# Patient Record
(demographics unavailable — no encounter records)

---

## 2025-01-22 NOTE — SOCIAL HISTORY
[FreeTextEntry6] : Household: Parents  and have shared custody.  -Has a brother 5 years and sister 3 years.

## 2025-01-22 NOTE — REASON FOR VISIT
[FreeTextEntry2] : Follow up for ADHD monitoring and medication management. [FreeTextEntry4] : Azstarys 39.2mg/7.8mg daily  MPH IR Solution 8-10mg on weekends as needed.     [FreeTextEntry1] : Mother  [FreeTextEntry3] : August 2024 [TextEntry] : This visit was provided via telehealth using real-time 2-way audio visual technology. The patient, ANNA VILLAGOMEZ was located at home, [address] [place] [address], at the time of the visit.  The provider, MORGAN MCCARTHY, was located in [facility] at the time of the visit. The patient, ANNA VILLAGOMEZ and Provider participated in the telehealth encounter. Parent also participated. Verbal consent for telehealth services was given on Jan 22 2025  9:00AM by the patient/parent.

## 2025-01-22 NOTE — PLAN
[FreeTextEntry3] : Continue IEP/Current Services Continue Azstarys 39.2mg/7.8mg daily Continue MPH IR 5mg/5mL Solution - Take 8-10mL as needed in the afternoons/weekends. Answered parent/patient questions. Follow-up 3-4 months for continued monitoring Follow-up via telephone as needed.

## 2025-01-22 NOTE — HISTORY OF PRESENT ILLNESS
[FreeTextEntry5] : Grade/School: 3rd Grade Classroom Setting: General Education Classroom IEP: Speech/Language Impairment Services: OT 1x/week, Speech/Language Therapy 1x/week Private tutoring/therapy: None   [FreeTextEntry1] : School/Academics: -Mom reports that he is having a good school year -He continues to do well academically  -There have been no concerns from his teacher -He is working on his communication and conversational skills - mom says that sometimes he just needs to slow down and pause so the other person can respond.  -Doing well with homework - getting it done without issue.  Home: No concerns  Social: Overall, does well and gets along with his peers  Activities: baseball, football  Medication/Side Effects: -Mom reports that the current regimen continues to work very well for his symptoms. She says that it doesn't seem to be lasting as long as it used to but nothing unmanageable.  -She reports that he has more evening activities after school now, so they have been using the SA booster more.  Duration: Takes it about 7:45am and wears off 4:30-5pm  Appetite/Diet: No decreased appetite while on medication - eats as he normally would. Sleep: Sleeps well overall - some nights take longer for him to settle. Mom gives melatonin and finds it helpful. HA: None SA: None Tics: None [FreeTextEntry6] : Medication Hx:\par  Metadate CD 10mg (1/2023-3/2023) - more focused but angry/irritable, tantrums at home\par  Focalin XR 5mg (June 2023) - Alvarado reported he liked this medication but began making comments about killing himself when boundaries being set\par

## 2025-03-11 NOTE — PHYSICAL EXAM
[Normal] : soft, non-tender, non-distended, no masses palpated, no HSM and normal bowel sounds [de-identified] : Testicles both retracted. I believe I am feeling both but difficult to assess.

## 2025-04-23 NOTE — CONSULT LETTER
[FreeTextEntry1] : Dear Dr. JOHN PENALOZA,      I had the pleasure of consulting on ANNA VILLAGOMEZ today.  Below is my note regarding the office visit today.      Thank you so very much for allowing me to participate in ANNA's care.  Please don't hesitate to call me should any questions or issues arise.        Sincerely,     Yosvany Mahajan MD, FACS, \[facility]\""U    Chief, Pediatric Urology    Professor of Urology and Pediatrics    [employer] [facility] [facility] [employer] [facility] [employer]        President, American Urological Association - [place] Section    Past-President, Societies for Pediatric Urology

## 2025-04-23 NOTE — DATA REVIEWED
[FreeTextEntry1] : EXAM: [id number] - US DPLX SCROTUM  - ORDERED BY: JOHN PENALOZA  PROCEDURE DATE:  03/20/2025   INTERPRETATION:  CLINICAL INFORMATION: Undescended testicles.  COMPARISON: None available.  TECHNIQUE: Testicular ultrasound utilizing color and spectral Doppler.  FINDINGS:  RIGHT: Right testis: 1.7 cm x 0.7 cm x 1.2 cm (volume 0.8 mL). Located in the right inguinal canal. Normal echogenicity and echotexture with no masses or areas of architectural distortion. Normal arterial and venous blood flow pattern. Right epididymis: Within normal limits. Right hydrocele: None. Right varicocele: None.  LEFT: Left testis: 1.7 cm x 0.8 cm x 1.0 cm (volume 0.6 mL). Located in the left inguinal canal. Normal echogenicity and echotexture with no masses or areas of architectural distortion. Normal arterial and venous blood flow pattern. Left epididymis: Within normal limits. Left hydrocele: None. Left varicocele: None.  IMPRESSION: Bilateral undescended testicles located within their respective inguinal canal. Otherwise, normal sonographic appearance of the testicles.  --- End of Report ---

## 2025-04-23 NOTE — HISTORY OF PRESENT ILLNESS
[TextBox_4] : ANNA is here for evaluation today. He is a healthy 8 year old who was noted recently to have an undescended testicle. This was NOT noted at birth. The testis has NOT been descending with time. No history of trauma or infection or inflammation. No pain or swelling on either side. Scrotal ultrasound ordered by PCP (3/20/25) demonstrated bilateral undescended testes in their respective inguinal canals. Presents today for examination.

## 2025-04-23 NOTE — DATA REVIEWED
[FreeTextEntry1] : EXAM: [id number] - US DPLX SCROTUM  - ORDERED BY: JOHN PENALOZA  PROCEDURE DATE:  03/20/2025   INTERPRETATION:  CLINICAL INFORMATION: Undescended testicles.  COMPARISON: None available.  TECHNIQUE: Testicular ultrasound utilizing color and spectral Doppler.  FINDINGS:  RIGHT: Right testis: 1.7 cm x 0.7 cm x 1.2 cm (volume 0.8 mL). Located in the right inguinal canal. Normal echogenicity and echotexture with no masses or areas of architectural distortion. Normal arterial and venous blood flow pattern. Right epididymis: Within normal limits. Right hydrocele: None. Right varicocele: None.  LEFT: Left testis: 1.7 cm x 0.8 cm x 1.0 cm (volume 0.6 mL). Located in the left inguinal canal. Normal echogenicity and echotexture with no masses or areas of architectural distortion. Normal arterial and venous blood flow pattern. Left epididymis: Within normal limits. Left hydrocele: None. Left varicocele: None.  IMPRESSION: Bilateral undescended testicles located within their respective inguinal canal. Otherwise, normal sonographic appearance of the testicles.  --- End of Report ---

## 2025-04-23 NOTE — CONSULT LETTER
[FreeTextEntry1] : Dear Dr. JOHN PENALOZA,      I had the pleasure of consulting on ANNA VILLAGOMEZ today.  Below is my note regarding the office visit today.      Thank you so very much for allowing me to participate in ANNA's care.  Please don't hesitate to call me should any questions or issues arise.        Sincerely,     Yosvany Mahajan MD, FACS, [facility]    Chief, Pediatric Urology    Professor of Urology and Pediatrics    [employer] [facility] [employer]        President, American Urological Association - [place] Section    Past-President, Societies for Pediatric Urology

## 2025-04-23 NOTE — DATA REVIEWED
[FreeTextEntry1] : EXAM: [id number] - US DPLX SCROTUM  - ORDERED BY: JOHN PENALOZA  PROCEDURE DATE:  03/20/2025   INTERPRETATION:  CLINICAL INFORMATION: Undescended testicles.  COMPARISON: None available.  TECHNIQUE: Testicular ultrasound utilizing color and spectral Doppler.  FINDINGS:  RIGHT: Right testis: 1.7 cm x 0.7 cm x 1.2 cm (volume 0.8 mL). Located in the right inguinal canal. Normal echogenicity and echotexture with no masses or areas of architectural distortion. Normal arterial and venous blood flow pattern. Right epididymis: Within normal limits. Right hydrocele: None. Right varicocele: None.  LEFT: Left testis: 1.7 cm x 0.8 cm x 1.0 cm (volume 0.6 mL). Located in the left inguinal canal. Normal echogenicity and echotexture with no masses or areas of architectural distortion. Normal arterial and venous blood flow pattern. Left epididymis: Within normal limits. Left hydrocele: None. Left varicocele: None.  IMPRESSION: Bilateral undescended testicles located within their respective inguinal canal. Otherwise, normal sonographic appearance of the testicles.  --- End of Report ---

## 2025-04-23 NOTE — CONSULT LETTER
[FreeTextEntry1] : Dear Dr. JOHN PENALOZA,      I had the pleasure of consulting on ANNA VILLAGOMEZ today.  Below is my note regarding the office visit today.      Thank you so very much for allowing me to participate in ANNA's care.  Please don't hesitate to call me should any questions or issues arise.        Sincerely,     Yosvany Mahajan MD, FACS, [facility]    Chief, Pediatric Urology    Professor of Urology and Pediatrics    [facility] [employer]        President, American Urological Association - [place] Section    Past-President, Societies for Pediatric Urology

## 2025-05-07 NOTE — REASON FOR VISIT
[FreeTextEntry2] : Follow up for ADHD monitoring and medication management. [FreeTextEntry4] : Azstarys 39.2mg/7.8mg daily  MPH IR Solution 8-10mg on weekends as needed.     [FreeTextEntry1] : Alvarado and Father [FreeTextEntry3] : January 2025

## 2025-05-07 NOTE — HISTORY OF PRESENT ILLNESS
[FreeTextEntry5] : Grade/School: 3rd Grade Classroom Setting: General Education Classroom IEP: Speech/Language Impairment Services: OT 1x/week, Speech/Language Therapy 1x/week Private tutoring/therapy: None   [FreeTextEntry1] : School/Academics: -Alvarado and dad report that he continues to have a good school year -He continues to do well academically -There have been no concerns from his teacher -Does well with homework completion - does it as soon as he gets home. -Dad reports they will have his IEP meeting next month - he doesn't anticipate any changes for next year  Home: No concerns  Social: Overall, does well and gets along with his peers  Activities: baseball (pitcher/catcher), travel football  Medication/Side Effects: -Alvarado reports that his focus/attention in school are good on the current regimen -Dad agrees that the current regimen continues to provide good symptom control for him. Doesn't feel he needs any adjustments Duration: Takes it about 7:45am and wears off 4:30-5pm  Appetite/Diet: No decreased appetite while on medication - eats as he normally would. Sleep: Sleeps well overall - some nights take longer for him to settle. Mom gives melatonin and finds it helpful. HA: None SA: None Tics: None [FreeTextEntry6] : Medication Hx:\par  Metadate CD 10mg (1/2023-3/2023) - more focused but angry/irritable, tantrums at home\par  Focalin XR 5mg (June 2023) - Alvarado reported he liked this medication but began making comments about killing himself when boundaries being set\par